# Patient Record
Sex: MALE | Race: WHITE | NOT HISPANIC OR LATINO | Employment: FULL TIME | URBAN - METROPOLITAN AREA
[De-identification: names, ages, dates, MRNs, and addresses within clinical notes are randomized per-mention and may not be internally consistent; named-entity substitution may affect disease eponyms.]

---

## 2022-08-14 ENCOUNTER — HOSPITAL ENCOUNTER (EMERGENCY)
Facility: HOSPITAL | Age: 37
Discharge: HOME | End: 2022-08-14
Attending: EMERGENCY MEDICINE
Payer: COMMERCIAL

## 2022-08-14 VITALS
TEMPERATURE: 99 F | BODY MASS INDEX: 28.72 KG/M2 | HEIGHT: 67 IN | HEART RATE: 69 BPM | OXYGEN SATURATION: 99 % | RESPIRATION RATE: 16 BRPM | SYSTOLIC BLOOD PRESSURE: 139 MMHG | WEIGHT: 183 LBS | DIASTOLIC BLOOD PRESSURE: 93 MMHG

## 2022-08-14 DIAGNOSIS — R51.9 FRONTAL HEADACHE: Primary | ICD-10-CM

## 2022-08-14 LAB
CTP QC/QA: YES
SARS-COV-2 RDRP RESP QL NAA+PROBE: NEGATIVE

## 2022-08-14 PROCEDURE — 96372 THER/PROPH/DIAG INJ SC/IM: CPT | Performed by: PHYSICIAN ASSISTANT

## 2022-08-14 PROCEDURE — 63600175 PHARM REV CODE 636 W HCPCS: Performed by: PHYSICIAN ASSISTANT

## 2022-08-14 PROCEDURE — 99284 EMERGENCY DEPT VISIT MOD MDM: CPT | Mod: 25

## 2022-08-14 PROCEDURE — U0002 COVID-19 LAB TEST NON-CDC: HCPCS | Performed by: STUDENT IN AN ORGANIZED HEALTH CARE EDUCATION/TRAINING PROGRAM

## 2022-08-14 PROCEDURE — 25000003 PHARM REV CODE 250: Performed by: PHYSICIAN ASSISTANT

## 2022-08-14 PROCEDURE — 96372 THER/PROPH/DIAG INJ SC/IM: CPT | Mod: 59

## 2022-08-14 RX ORDER — BUTALBITAL, ACETAMINOPHEN AND CAFFEINE 50; 325; 40 MG/1; MG/1; MG/1
1 TABLET ORAL
Status: COMPLETED | OUTPATIENT
Start: 2022-08-14 | End: 2022-08-14

## 2022-08-14 RX ORDER — BUTALBITAL, ACETAMINOPHEN AND CAFFEINE 50; 325; 40 MG/1; MG/1; MG/1
1 TABLET ORAL EVERY 4 HOURS PRN
Qty: 20 TABLET | Refills: 0 | Status: SHIPPED | OUTPATIENT
Start: 2022-08-14 | End: 2022-09-13

## 2022-08-14 RX ORDER — DEXAMETHASONE SODIUM PHOSPHATE 4 MG/ML
12 INJECTION, SOLUTION INTRA-ARTICULAR; INTRALESIONAL; INTRAMUSCULAR; INTRAVENOUS; SOFT TISSUE
Status: COMPLETED | OUTPATIENT
Start: 2022-08-14 | End: 2022-08-14

## 2022-08-14 RX ORDER — PROCHLORPERAZINE MALEATE 10 MG
10 TABLET ORAL 3 TIMES DAILY PRN
Qty: 12 TABLET | Refills: 0 | Status: SHIPPED | OUTPATIENT
Start: 2022-08-14

## 2022-08-14 RX ADMIN — DEXAMETHASONE SODIUM PHOSPHATE 12 MG: 4 INJECTION INTRA-ARTICULAR; INTRALESIONAL; INTRAMUSCULAR; INTRAVENOUS; SOFT TISSUE at 10:08

## 2022-08-14 RX ADMIN — BUTALBITAL, ACETAMINOPHEN AND CAFFEINE 1 TABLET: 50; 325; 40 TABLET ORAL at 11:08

## 2022-08-15 NOTE — ED PROVIDER NOTES
Encounter Date: 8/14/2022       History     Chief Complaint   Patient presents with    Headache     Pt comes to the er via pov with c/o headache that started x1 week ago. Pt stated that the headache started small above his left eye and has gotten bigger and more intense. Pt states that he has taken medication to relieve the headache with no relief.      37yo M smoker with chief complaint L frontal HA x 6-7d, now to entire forehead. Pain mostly just above OS. No visual disturbance. No hx glaucoma. No trauma. No fever. No sinus issues. No URI. No lightheadedness, dizziness, syncope or near syncope, n/v, arm or leg weakness, slurred speech, facial droop, aphasia. Initially relief with Ibuprofen, no relief over the past few days. HA constant, moderate, pressure-type pain.     PMH:  Anxiety  Thyroid CA s/p R hemithyroidectomy  Post operative hypothyroidism        Review of patient's allergies indicates:  No Known Allergies  Past Medical History:   Diagnosis Date    Thyroid cancer      Past Surgical History:   Procedure Laterality Date    THYROIDECTOMY       History reviewed. No pertinent family history.  Social History     Tobacco Use    Smoking status: Current Every Day Smoker     Types: Vaping with nicotine    Smokeless tobacco: Never Used   Substance Use Topics    Alcohol use: Yes     Comment: social    Drug use: Never     Review of Systems   Constitutional: Negative for appetite change and fever.   HENT: Negative for congestion and ear pain.    Eyes: Negative for photophobia and visual disturbance.   Gastrointestinal: Negative for nausea and vomiting.   Musculoskeletal: Negative for neck pain and neck stiffness.   Neurological: Positive for headaches. Negative for dizziness, syncope, facial asymmetry, speech difficulty, weakness and light-headedness.       Physical Exam     Initial Vitals [08/14/22 2131]   BP Pulse Resp Temp SpO2   (!) 142/97 78 16 98.4 °F (36.9 °C) 98 %      MAP       --         Physical  Exam    Nursing note and vitals reviewed.  Constitutional: He appears well-developed and well-nourished. He is not diaphoretic. No distress.   HENT:   Head: Normocephalic and atraumatic.   Eyes: EOM are normal. Pupils are equal, round, and reactive to light.   No nystagmus   Neck: Neck supple.   Normal range of motion.  Pulmonary/Chest: No respiratory distress.   Musculoskeletal:         General: No tenderness. Normal range of motion.      Cervical back: Normal range of motion and neck supple.     Neurological: He is alert and oriented to person, place, and time. GCS score is 15. GCS eye subscore is 4. GCS verbal subscore is 5. GCS motor subscore is 6.   Grossly symmetric upper and lower extremity strength.  Normal, steady gait.   Skin: Skin is warm. Capillary refill takes less than 2 seconds.   Psychiatric: He has a normal mood and affect. Thought content normal.         ED Course   Procedures  Labs Reviewed   SARS-COV-2 RDRP GENE          Imaging Results          CT Head Without Contrast (Final result)  Result time 08/14/22 23:07:55    Final result by Candida Burch MD (08/14/22 23:07:55)                 Impression:      No acute intracranial abnormalities identified.      Electronically signed by: Candida Burch MD  Date:    08/14/2022  Time:    23:07             Narrative:    EXAMINATION:  CT HEAD WITHOUT CONTRAST    CLINICAL HISTORY:  Headache, chronic, new features or increased frequency;L frontal HA; sinus complaints; hx thyroid CA--r/o obvious mets;    TECHNIQUE:  Low dose axial images were obtained through the head.  Coronal and sagittal reformations were also performed. Contrast was not administered.    COMPARISON:  None.    FINDINGS:  The brain is normally formed and exhibits normal density throughout with no indication of acute/recent major vascular distribution cerebral infarction, intraparenchymal hemorrhage, or intra-axial space occupying lesion. The ventricular system is normal in size and  configuration with no evidence of hydrocephalus. No effacement of the skull-base cisterns. No abnormal extra-axial fluid collections or blood products. Visualized paranasal sinuses and mastoid air cells are essentially clear.  The calvarium shows no significant abnormality.                                 Medications   butalbital-acetaminophen-caffeine -40 mg per tablet 1 tablet (has no administration in time range)   dexamethasone injection 12 mg (12 mg Intramuscular Given 8/14/22 2246)     Medical Decision Making:   Differential Diagnosis:   Sinusitis, migraine, headache disorder, metastatic disease  Clinical Tests:   Lab Tests: Ordered and Reviewed  Radiological Study: Ordered and Reviewed  ED Management:  CT head unremarkable.  No evidence of any significant sinus disease.  May represent migraine or headache disorder.  No worrisome neurologic complaints.  No focal deficits on exam.  No history of CVA.  Will treat with Compazine and Benadryl.  Decadron to prevent rebound headaches.  Takes Benadryl nightly to help with sleep.  Advised to try Flonase see if there is any relief as well.  Referral placed to neurology given persistent headache.  Return precautions discussed.  He is comfortable with this plan, comfortable with outpatient follow-up.                      Clinical Impression:   Final diagnoses:  [R51.9] Frontal headache (Primary)          ED Disposition Condition    Discharge Stable        ED Prescriptions     Medication Sig Dispense Start Date End Date Auth. Provider    prochlorperazine (COMPAZINE) 10 MG tablet Take 1 tablet (10 mg total) by mouth 3 (three) times daily as needed (headache). 12 tablet 8/14/2022  Saeed Sharif PA-C    butalbital-acetaminophen-caffeine -40 mg (FIORICET, ESGIC) -40 mg per tablet Take 1 tablet by mouth every 4 (four) hours as needed for Headaches. 20 tablet 8/14/2022 9/13/2022 Saeed Sharif PA-C        Follow-up Information     Follow up With  Specialties Details Why Contact Info    [facility] NEUROLOGY Neurology Schedule an appointment as soon as possible for a visit  For reevaluation, If symptoms persist despite treatment [id number] [facility] [address]  [phone]           Saeed Sharif PA-C  08/14/22 9930

## 2022-08-15 NOTE — DISCHARGE INSTRUCTIONS
Take the compazine with 25mg of Benadryl, especially at night. Be aware, this medication is sedating.  Do not mix with alcohol or any other sedating medications.  Do not drive or operate machinery when taking this medication.     You can try Fioricet throughout the day.    Follow-up with Neurology should you experience persistent symptoms.    Return to this ED if headache worsens despite treatment, if you begin with neck pain or stiffness, fever, arm or leg weakness, unsteady gait, lightheadedness, if any other problems occur.

## 2022-08-15 NOTE — ED TRIAGE NOTES
Taqueria Bruce is a 36 y.o male to ED c/o HA x1 wk.  States that pain was initially above left eye but pain intensified over the past few days.  Worse pain 8/10 & 8/11/22.  Denies n/v, visual changes, or other complaints.  Hx of thyroid cancer.  Surg hx thyroidectomy

## 2022-12-28 ENCOUNTER — HOSPITAL ENCOUNTER (EMERGENCY)
Facility: HOSPITAL | Age: 37
Discharge: HOME | End: 2022-12-28
Attending: EMERGENCY MEDICINE
Payer: COMMERCIAL

## 2022-12-28 VITALS
OXYGEN SATURATION: 97 % | SYSTOLIC BLOOD PRESSURE: 118 MMHG | WEIGHT: 180 LBS | DIASTOLIC BLOOD PRESSURE: 72 MMHG | HEIGHT: 67 IN | BODY MASS INDEX: 28.25 KG/M2 | RESPIRATION RATE: 15 BRPM | HEART RATE: 63 BPM | TEMPERATURE: 98 F

## 2022-12-28 DIAGNOSIS — R07.9 CHEST PAIN: ICD-10-CM

## 2022-12-28 DIAGNOSIS — R07.89 CHEST WALL PAIN: Primary | ICD-10-CM

## 2022-12-28 LAB
ALBUMIN SERPL BCP-MCNC: 4.4 G/DL (ref 3.5–5.2)
ALP SERPL-CCNC: 62 U/L (ref 55–135)
ALT SERPL W/O P-5'-P-CCNC: 24 U/L (ref 10–44)
ANION GAP SERPL CALC-SCNC: 9 MMOL/L (ref 8–16)
AST SERPL-CCNC: 19 U/L (ref 10–40)
BASOPHILS # BLD AUTO: 0.02 K/UL (ref 0–0.2)
BASOPHILS NFR BLD: 0.4 % (ref 0–1.9)
BILIRUB SERPL-MCNC: 0.6 MG/DL (ref 0.1–1)
BNP SERPL-MCNC: <10 PG/ML (ref 0–99)
BUN SERPL-MCNC: 15 MG/DL (ref 6–20)
CALCIUM SERPL-MCNC: 9.1 MG/DL (ref 8.7–10.5)
CHLORIDE SERPL-SCNC: 102 MMOL/L (ref 95–110)
CO2 SERPL-SCNC: 28 MMOL/L (ref 23–29)
CREAT SERPL-MCNC: 1.2 MG/DL (ref 0.5–1.4)
CTP QC/QA: YES
D DIMER PPP IA.FEU-MCNC: <0.19 MG/L FEU
DIFFERENTIAL METHOD: ABNORMAL
EOSINOPHIL # BLD AUTO: 0.1 K/UL (ref 0–0.5)
EOSINOPHIL NFR BLD: 1.8 % (ref 0–8)
ERYTHROCYTE [DISTWIDTH] IN BLOOD BY AUTOMATED COUNT: 11 % (ref 11.5–14.5)
EST. GFR  (NO RACE VARIABLE): >60 ML/MIN/1.73 M^2
GLUCOSE SERPL-MCNC: 111 MG/DL (ref 70–110)
HCT VFR BLD AUTO: 42.5 % (ref 40–54)
HGB BLD-MCNC: 14.9 G/DL (ref 14–18)
IMM GRANULOCYTES # BLD AUTO: 0.01 K/UL (ref 0–0.04)
IMM GRANULOCYTES NFR BLD AUTO: 0.2 % (ref 0–0.5)
LYMPHOCYTES # BLD AUTO: 1.7 K/UL (ref 1–4.8)
LYMPHOCYTES NFR BLD: 37.7 % (ref 18–48)
MCH RBC QN AUTO: 31.1 PG (ref 27–31)
MCHC RBC AUTO-ENTMCNC: 35.1 G/DL (ref 32–36)
MCV RBC AUTO: 89 FL (ref 82–98)
MONOCYTES # BLD AUTO: 0.5 K/UL (ref 0.3–1)
MONOCYTES NFR BLD: 10.3 % (ref 4–15)
NEUTROPHILS # BLD AUTO: 2.2 K/UL (ref 1.8–7.7)
NEUTROPHILS NFR BLD: 49.6 % (ref 38–73)
NRBC BLD-RTO: 0 /100 WBC
PLATELET # BLD AUTO: 192 K/UL (ref 150–450)
PMV BLD AUTO: 10.7 FL (ref 9.2–12.9)
POTASSIUM SERPL-SCNC: 4.1 MMOL/L (ref 3.5–5.1)
PROT SERPL-MCNC: 7.8 G/DL (ref 6–8.4)
RBC # BLD AUTO: 4.79 M/UL (ref 4.6–6.2)
SARS-COV-2 RDRP RESP QL NAA+PROBE: NEGATIVE
SODIUM SERPL-SCNC: 139 MMOL/L (ref 136–145)
TROPONIN I SERPL DL<=0.01 NG/ML-MCNC: <0.006 NG/ML (ref 0–0.03)
WBC # BLD AUTO: 4.48 K/UL (ref 3.9–12.7)

## 2022-12-28 PROCEDURE — 99285 EMERGENCY DEPT VISIT HI MDM: CPT | Mod: 25

## 2022-12-28 PROCEDURE — 93010 EKG 12-LEAD: ICD-10-PCS | Mod: ,,, | Performed by: INTERNAL MEDICINE

## 2022-12-28 PROCEDURE — 83880 ASSAY OF NATRIURETIC PEPTIDE: CPT | Performed by: EMERGENCY MEDICINE

## 2022-12-28 PROCEDURE — 93010 ELECTROCARDIOGRAM REPORT: CPT | Mod: ,,, | Performed by: INTERNAL MEDICINE

## 2022-12-28 PROCEDURE — 85379 FIBRIN DEGRADATION QUANT: CPT | Performed by: EMERGENCY MEDICINE

## 2022-12-28 PROCEDURE — 80053 COMPREHEN METABOLIC PANEL: CPT | Performed by: EMERGENCY MEDICINE

## 2022-12-28 PROCEDURE — 63600175 PHARM REV CODE 636 W HCPCS: Performed by: EMERGENCY MEDICINE

## 2022-12-28 PROCEDURE — 25000242 PHARM REV CODE 250 ALT 637 W/ HCPCS: Performed by: EMERGENCY MEDICINE

## 2022-12-28 PROCEDURE — 94640 AIRWAY INHALATION TREATMENT: CPT

## 2022-12-28 PROCEDURE — 84484 ASSAY OF TROPONIN QUANT: CPT | Performed by: EMERGENCY MEDICINE

## 2022-12-28 PROCEDURE — 85025 COMPLETE CBC W/AUTO DIFF WBC: CPT | Performed by: EMERGENCY MEDICINE

## 2022-12-28 PROCEDURE — 96374 THER/PROPH/DIAG INJ IV PUSH: CPT

## 2022-12-28 PROCEDURE — 87635 SARS-COV-2 COVID-19 AMP PRB: CPT | Performed by: EMERGENCY MEDICINE

## 2022-12-28 PROCEDURE — 93005 ELECTROCARDIOGRAM TRACING: CPT

## 2022-12-28 RX ORDER — IBUPROFEN 600 MG/1
600 TABLET ORAL EVERY 6 HOURS PRN
Qty: 20 TABLET | Refills: 0 | Status: SHIPPED | OUTPATIENT
Start: 2022-12-28

## 2022-12-28 RX ORDER — IPRATROPIUM BROMIDE AND ALBUTEROL SULFATE 2.5; .5 MG/3ML; MG/3ML
3 SOLUTION RESPIRATORY (INHALATION)
Status: COMPLETED | OUTPATIENT
Start: 2022-12-28 | End: 2022-12-28

## 2022-12-28 RX ORDER — KETOROLAC TROMETHAMINE 30 MG/ML
15 INJECTION, SOLUTION INTRAMUSCULAR; INTRAVENOUS
Status: COMPLETED | OUTPATIENT
Start: 2022-12-28 | End: 2022-12-28

## 2022-12-28 RX ORDER — ALBUTEROL SULFATE 90 UG/1
2 AEROSOL, METERED RESPIRATORY (INHALATION) EVERY 6 HOURS PRN
Qty: 18 G | Refills: 0 | Status: SHIPPED | OUTPATIENT
Start: 2022-12-28 | End: 2023-12-28

## 2022-12-28 RX ORDER — LEVOTHYROXINE SODIUM 175 UG/1
175 TABLET ORAL
COMMUNITY
Start: 2022-11-02

## 2022-12-28 RX ADMIN — KETOROLAC TROMETHAMINE 15 MG: 30 INJECTION, SOLUTION INTRAMUSCULAR; INTRAVENOUS at 11:12

## 2022-12-28 RX ADMIN — IPRATROPIUM BROMIDE AND ALBUTEROL SULFATE 3 ML: 2.5; .5 SOLUTION RESPIRATORY (INHALATION) at 10:12

## 2022-12-28 NOTE — ED TRIAGE NOTES
Pt to ED with complaints of chest pain/pressure accompanied by SOB X3 days with night sweats last night. States has a pulse oximeter at home that has been showing 88%. 97% RA in triage.   Denies congestion, cough, dizziness, bladder bowel issues.   Pt AAOX4

## 2022-12-28 NOTE — ED PROVIDER NOTES
Encounter Date: 12/28/2022    SCRIBE #1 NOTE: I, Kacie Fregoso, am scribing for, and in the presence of,  Gabriel Renee MD. I have scribed the following portions of the note - Other sections scribed: HPI, ROS.     History     Chief Complaint   Patient presents with    Chest Pain     Pt to ED with complaints of chest pain/pressure accompanied by SOB X3 days. States pain is constant, feels like elephant on chest. States has a pulse oximeter at home that has been showing 88%. 97% RA in triage. Denies congestion, cough.      Taqueria Bruce is a 37 y.o. male, with a PMHx of Thyroid Cancer, who presents to the ED with intermittent chest pain accompanied with sob for the past 3 days. Patient notes he had mild/sharp waxing and waning mid-sternal chest pain 2 days ago at 3 am. Patient states his chest pain is very mild during his assessment. Patient notes he bought a pulse oximeter and it reported his O2 was 88% to 83%, which prompted his visit today. No other exacerbating or alleviating factors. Patient reports he has been coughing a little, but denies congestion or other associated symptoms.     The history is provided by the patient. No  was used.   Review of patient's allergies indicates:  No Known Allergies  Past Medical History:   Diagnosis Date    Thyroid cancer      Past Surgical History:   Procedure Laterality Date    THYROIDECTOMY       History reviewed. No pertinent family history.  Social History     Tobacco Use    Smoking status: Every Day     Types: Vaping with nicotine    Smokeless tobacco: Never   Substance Use Topics    Alcohol use: Yes     Comment: social    Drug use: Never     Review of Systems   Constitutional: Negative.    HENT: Negative.  Negative for congestion.    Eyes: Negative.    Respiratory:  Positive for cough and shortness of breath.    Cardiovascular:  Positive for chest pain.   Gastrointestinal: Negative.    Genitourinary: Negative.    Musculoskeletal: Negative.     Skin: Negative.    Neurological: Negative.      Physical Exam     Initial Vitals [12/28/22 0858]   BP Pulse Resp Temp SpO2   (!) 145/99 85 16 98.2 °F (36.8 °C) 98 %      MAP       --         Physical Exam    Nursing note and vitals reviewed.  Constitutional: He appears well-developed and well-nourished. He is not diaphoretic. No distress.   HENT:   Head: Normocephalic and atraumatic.   Nose: Nose normal.   Mouth/Throat: No oropharyngeal exudate.   Eyes: EOM are normal. Pupils are equal, round, and reactive to light.   Neck: Neck supple. No tracheal deviation present. No JVD present.   Normal range of motion.  Cardiovascular:  Normal rate, regular rhythm, normal heart sounds and intact distal pulses.           Pulmonary/Chest: Breath sounds normal. No respiratory distress. He has no wheezes. He has no rhonchi. He has no rales.   Abdominal: Abdomen is soft. Bowel sounds are normal. He exhibits no distension. There is no abdominal tenderness. There is no rebound and no guarding.   Musculoskeletal:         General: No tenderness or edema. Normal range of motion.      Cervical back: Normal range of motion and neck supple.     Neurological: He is alert and oriented to person, place, and time. He has normal strength.   Skin: Skin is warm and dry. Capillary refill takes less than 2 seconds. No rash noted. No erythema.       ED Course   Procedures  Labs Reviewed   CBC W/ AUTO DIFFERENTIAL - Abnormal; Notable for the following components:       Result Value    MCH 31.1 (*)     RDW 11.0 (*)     All other components within normal limits   COMPREHENSIVE METABOLIC PANEL - Abnormal; Notable for the following components:    Glucose 111 (*)     All other components within normal limits   TROPONIN I   B-TYPE NATRIURETIC PEPTIDE   D DIMER, QUANTITATIVE   SARS-COV-2 RDRP GENE        ECG Results              EKG 12-lead (Final result)  Result time 12/28/22 16:50:44      Final result by Interface, Lab In [facility] (12/28/22 16:50:44)                    Narrative:    Test Reason : R07.9,    Vent. Rate : 080 BPM     Atrial Rate : 080 BPM     P-R Int : 132 ms          QRS Dur : 084 ms      QT Int : 360 ms       P-R-T Axes : 037 064 029 degrees     QTc Int : 415 ms    Normal sinus rhythm  Normal ECG  No previous ECGs available  Confirmed by Kye Sorenson MD ([id number]) on 12/28/2022 4:50:36 PM    Referred By:             Confirmed By:Kye Sorenson MD                                  Imaging Results              X-Ray Chest AP Portable (Final result)  Result time 12/28/22 10:17:48      Final result by Cheikh Cordoba MD (12/28/22 10:17:48)                   Impression:      See above      Electronically signed by: Cheikh Cordoba MD  Date:    12/28/2022  Time:    10:17               Narrative:    EXAMINATION:  XR CHEST AP PORTABLE    CLINICAL HISTORY:  Chest Pain;    TECHNIQUE:  Single frontal view of the chest was performed.    COMPARISON:  None    FINDINGS:  Heart size normal.  The lungs are clear.  No pleural effusion                                       Medications   albuterol-ipratropium 2.5 mg-0.5 mg/3 mL nebulizer solution 3 mL (3 mLs Nebulization Given 12/28/22 1004)   ketorolac injection 15 mg (15 mg Intravenous Given 12/28/22 1155)     Medical Decision Making:   History:   Old Medical Records: I decided to obtain old medical records.  Clinical Tests:   Lab Tests: Ordered and Reviewed       MDM:    37 y.o.male with PMHx as noted above, presents with chest pain. Physical exam as noted above.  ED workup remarkable for EKG - nsr, rate 80 bpm, no STEMI, trop - neg, BNP - neg, ddimer neg, CBC/CMP - unremarkable, CXR - unremarkable. Pt presentation consistent with chest pain, completely negative workup today, unremarkable EKG, negative cardiac markers.  Suspect this may be musculoskeletal etiology will give short course of anti-inflammatories, additionally patient noting some slight improvement in his shortness of breath with albuterol inhaler, will continue  treatment with albuterol inhaler outpatient for possible reactive airway disease.  At this time given patient's history, physical exam, and ED workup do not suspect arrhythmia, MI/ACS, PE, PTX, aortic dissection, pericarditis, PNA, shingles, or any further malignant cause.  Discussed diagnosis and further treatment with patient including f/u, return precautions given and all questions answered.  Patient in understanding of plan.  Pt discharged to home improved and stable.         Scribe Attestation:   Scribe #1: I performed the above scribed service and the documentation accurately describes the services I performed. I attest to the accuracy of the note.                   Clinical Impression:   Final diagnoses:  [R07.9] Chest pain  [R07.89] Chest wall pain (Primary)        ED Disposition Condition    Discharge Stable        I, Gabriel Renee M.D., personally performed the services described in this documentation. All medical record entries made by the scribe were at my direction and in my presence. I have reviewed the chart and agree that the record reflects my personal performance and is accurate and complete.   ED Prescriptions       Medication Sig Dispense Start Date End Date Auth. Provider    albuterol (PROVENTIL HFA) 90 mcg/actuation inhaler Inhale 2 puffs into the lungs every 6 (six) hours as needed for Wheezing. Rescue 18 g 12/28/2022 12/28/2023 Gabriel Renee MD    ibuprofen (ADVIL,MOTRIN) 600 MG tablet Take 1 tablet (600 mg total) by mouth every 6 (six) hours as needed for Pain. 20 tablet 12/28/2022 -- Gabriel Renee MD          Follow-up Information       Follow up With Specialties Details Why Contact [facility] Emergency Dept Emergency Medicine Go to  If symptoms worsen [id number]  [facility] [address] [phone]             Gabriel Renee MD  12/29/22 0938

## 2022-12-28 NOTE — Clinical Note
"Taqueria Crowe" Teo was seen and treated in our emergency department on 12/28/2022.  He may return to work on 12/29/2022.       If you have any questions or concerns, please don't hesitate to call.      Gabriel Renee MD"

## 2024-01-04 ENCOUNTER — OFFICE VISIT (OUTPATIENT)
Dept: URGENT CARE | Facility: CLINIC | Age: 39
End: 2024-01-04
Payer: OTHER MISCELLANEOUS

## 2024-01-04 VITALS
BODY MASS INDEX: 29.66 KG/M2 | HEART RATE: 76 BPM | OXYGEN SATURATION: 97 % | WEIGHT: 189 LBS | RESPIRATION RATE: 16 BRPM | SYSTOLIC BLOOD PRESSURE: 146 MMHG | TEMPERATURE: 97 F | HEIGHT: 67 IN | DIASTOLIC BLOOD PRESSURE: 92 MMHG

## 2024-01-04 DIAGNOSIS — R20.2 PARESTHESIAS: ICD-10-CM

## 2024-01-04 DIAGNOSIS — S46.911A STRAIN OF RIGHT UPPER ARM, INITIAL ENCOUNTER: Primary | ICD-10-CM

## 2024-01-04 DIAGNOSIS — Z02.6 ENCOUNTER RELATED TO WORKER'S COMPENSATION CLAIM: ICD-10-CM

## 2024-01-04 PROCEDURE — 99204 OFFICE O/P NEW MOD 45 MIN: CPT | Mod: S$GLB,,, | Performed by: EMERGENCY MEDICINE

## 2024-01-04 RX ORDER — ONDANSETRON HYDROCHLORIDE 8 MG/1
8 TABLET, FILM COATED ORAL 3 TIMES DAILY
COMMUNITY
Start: 2023-11-16

## 2024-01-04 RX ORDER — ROSUVASTATIN CALCIUM 20 MG/1
20 TABLET, COATED ORAL NIGHTLY
COMMUNITY
Start: 2023-09-05

## 2024-01-04 NOTE — PROGRESS NOTES
Subjective:      Patient ID: Taqueria Bruce is a 38 y.o. male.    Chief Complaint: Arm Injury (DOI: 01/28/2023 RT arm/LM)    Patient's place of employment - [employer]   Patient's job title - [employer]   Date of injury - 12/28/2023  Body part injured including left or right - RT Arm  Injury Mechanism -   What they were doing when they got hurt - Trying to start in engine when it locked up he felt his arm jerk.   What they did immediately after - reported injury   Pain scale right now - 1  LM    Patient is a 38-year-old male who works for [employer] and states he was trying to pull the cord for an injury to start up in effort several attempts the cord locked up and he felt a jerking shocking pain to his right upper extremity.  He reports occasional pain over the last week to the right shoulder elbow and wrist with some tingling intermittently in scattered areas and nonanatomic distribution of the right arm, lateral elbow, dorsal forearm and 4th and 5th digits.  All symptoms specifically pain has resolved with intermittent tingling scattered throughout.  He states that the initial shock was most felt on the volar forearm aspect.  Physical exam shows normal range of motion of the shoulder, elbow, wrist, fingers.  No sensory deficit and normal motor function.  There is no hematoma and he reports that swelling has resolved.  Will see back in 1 week as he will continue to rest, ice, elevate, take ibuprofen 600 mg every 6-8 hours as needed and will put into place limited use of the right upper extremity specifically no lifting with the right upper extremity over 10 lb.  Return to clinic 1 week for anticipated discharge from occupational health at that time.    Arm Injury   The incident occurred more than 1 week ago. The incident occurred at work. The quality of the pain is described as aching. The pain radiates to the left hand and left arm. The pain is at a severity of 1/10. The pain is mild. The pain has been  Fluctuating since the incident. Pertinent negatives include no chest pain, muscle weakness, numbness or tingling. The symptoms are aggravated by movement. He has tried NSAIDs for the symptoms. The treatment provided mild relief.       ROS    Constitution: Negative for chills and fever.   HENT:  Negative for postnasal drip, sinus pain and sore throat.    Neck: Negative for neck pain and neck stiffness.   Cardiovascular:  Negative for chest pain and palpitations.   Respiratory:  Negative for cough and shortness of breath.    Genitourinary:  Negative for dysuria and hematuria.   Musculoskeletal:  Positive for pain. Negative for joint pain.   Skin:  Negative for wound and laceration.   Neurological:  Negative for altered mental status, numbness and tingling.   Psychiatric/Behavioral:  Negative for altered mental status.      Objective:     Physical Exam  Vitals and nursing note reviewed.   Constitutional:       General: He is not in acute distress.     Appearance: Normal appearance. He is well-developed. He is not ill-appearing, toxic-appearing or diaphoretic.   HENT:      Head: Normocephalic and atraumatic. No abrasion, contusion or laceration.      Jaw: No trismus.      Right Ear: Hearing, tympanic membrane, ear canal and external ear normal. No hemotympanum.      Left Ear: Hearing, tympanic membrane, ear canal and external ear normal. No hemotympanum.      Nose: Nose normal. No nasal deformity, mucosal edema or rhinorrhea.      Right Sinus: No maxillary sinus tenderness or frontal sinus tenderness.      Left Sinus: No maxillary sinus tenderness or frontal sinus tenderness.      Mouth/Throat:      Dentition: Normal dentition.      Pharynx: Uvula midline. No posterior oropharyngeal erythema or uvula swelling.   Eyes:      General: Lids are normal. No scleral icterus.        Right eye: No discharge.         Left eye: No discharge.      Conjunctiva/sclera: Conjunctivae normal.      Pupils: Pupils are equal, round, and  reactive to light.      Comments: Sclera clear bilat   Neck:      Trachea: Trachea and phonation normal. No tracheal deviation.   Cardiovascular:      Rate and Rhythm: Normal rate and regular rhythm.      Pulses: Normal pulses.      Heart sounds: Normal heart sounds.   Pulmonary:      Effort: Pulmonary effort is normal. No respiratory distress.      Breath sounds: Normal breath sounds.   Abdominal:      General: Bowel sounds are normal. There is no distension.      Palpations: Abdomen is soft. There is no mass or pulsatile mass.      Tenderness: There is no abdominal tenderness.   Musculoskeletal:         General: Signs of injury present. No swelling or deformity. Normal range of motion.      Cervical back: Full passive range of motion without pain, normal range of motion and neck supple. No rigidity. No spinous process tenderness or muscular tenderness.      Comments: EXAMINATION OF THE RIGHT UPPER EXTREMITY SHOWS NORMAL RANGE OF MOTION OF THE RIGHT SHOULDER INCLUDING FLEXION EXTENSION INTERNAL AND EXTERNAL ROTATION.  EXAMINATION OF THE RIGHT ELBOW SHOWS NORMAL RANGE OF MOTION WITH NO PAIN ON PRONATION OR SUPINATION FLEXION-EXTENSION.  EXAMINATION OF THE RIGHT WRIST SHOWS NO TENDERNESS TO PALPATION.  HE DOES STATE THAT HE OCCASIONALLY HAS SHORT-LIVED TINGLING SENSATION TO THE LATERAL ELBOW DORSAL WRIST AND 4TH AND 5TH DIGIT WHICH IS RESOLVED AT THIS TIME.  THIS IS SUBJECTIVE AND CURRENTLY NOT PRESENT   Skin:     General: Skin is warm and dry.      Capillary Refill: Capillary refill takes less than 2 seconds.      Coloration: Skin is not pale.      Findings: No abrasion, bruising, burn, ecchymosis or laceration.   Neurological:      Mental Status: He is alert and oriented to person, place, and time.      GCS: GCS eye subscore is 4. GCS verbal subscore is 5. GCS motor subscore is 6.      Cranial Nerves: No cranial nerve deficit.      Sensory: No sensory deficit.      Motor: No abnormal muscle tone or seizure  activity.      Coordination: Coordination normal.   Psychiatric:         Speech: Speech normal.         Behavior: Behavior normal. Behavior is cooperative.         Thought Content: Thought content normal.         Judgment: Judgment normal.         Assessment:      1. Strain of right upper arm, initial encounter    2. Paresthesias    3. Encounter related to worker's compensation claim      Plan:     Patient is a 38-year-old male who works for [employer] and states he was trying to pull the cord for an injury to start up in effort several attempts the cord locked up and he felt a jerking shocking pain to his right upper extremity.  He reports occasional pain over the last week to the right shoulder elbow and wrist with some tingling intermittently in scattered areas and nonanatomic distribution of the right arm, lateral elbow, dorsal forearm and 4th and 5th digits.  All symptoms specifically pain has resolved with intermittent tingling scattered throughout.  He states that the initial shock was most felt on the volar forearm aspect.  Physical exam shows normal range of motion of the shoulder, elbow, wrist, fingers.  No sensory deficit and normal motor function.  There is no hematoma and he reports that swelling has resolved.  Will see back in 1 week as he will continue to rest, ice, elevate, take ibuprofen 600 mg every 6-8 hours as needed and will put into place limited use of the right upper extremity specifically no lifting with the right upper extremity over 10 lb.  Return to clinic 1 week for anticipated discharge from occupational health at that time.     Patient Instructions: Attention not to aggravate affected area, Apply ice 24-48 hours then apply heat/warm soaks, Elevated affected area   Restrictions: No lifting/pushing/pulling more than 10 lbs, Limited use of right hand and arm  Follow up in about 1 week (around 1/11/2024).

## 2024-01-04 NOTE — LETTER
[facility] - Urgent Care  [address] [facility], [address]  [place] [address]  Phone: [phone]  Fax: [phone]  [facility]: [id number]    Pt Name: Taqueria Bruce  Injury Date: 11/28/2023   Employee ID: [id number] Date of First Treatment: 01/04/2024   Company: [employer] to [employer]      Appointment Time: 11:00 AM Arrived: 11:24 am   Provider: Gurvinder Tomlinson MD Time Out:12:45 pm     Office Treatment:   1. Strain of right upper arm, initial encounter    2. Paresthesias    3. Encounter related to worker's compensation claim          Patient Instructions: Attention not to aggravate affected area, Apply ice 24-48 hours then apply heat/warm soaks, Elevated affected area    Restrictions: No lifting/pushing/pulling more than 10 lbs, Limited use of right hand and arm     Return Appointment: 1/11/2024 at 9:30 am  SW

## 2024-01-11 ENCOUNTER — OFFICE VISIT (OUTPATIENT)
Dept: URGENT CARE | Facility: CLINIC | Age: 39
End: 2024-01-11
Payer: OTHER MISCELLANEOUS

## 2024-01-11 DIAGNOSIS — Z02.6 ENCOUNTER RELATED TO WORKER'S COMPENSATION CLAIM: ICD-10-CM

## 2024-01-11 DIAGNOSIS — R20.2 PARESTHESIAS: ICD-10-CM

## 2024-01-11 DIAGNOSIS — S46.911D STRAIN OF RIGHT UPPER ARM, SUBSEQUENT ENCOUNTER: Primary | ICD-10-CM

## 2024-01-11 PROCEDURE — 99214 OFFICE O/P EST MOD 30 MIN: CPT | Mod: S$GLB,,, | Performed by: EMERGENCY MEDICINE

## 2024-01-11 NOTE — LETTER
[facility] - Urgent Care  [address] [facility], [address]  [place] [address]  Phone: [phone]  Fax: [phone]  [facility]: [id number]    Pt Name: Taqueria Bruce  Injury Date: 11/28/2023   Employee ID: [id number] Date of  Treatment: 01/11/2024   Company: [email or web address]      Appointment Time: 09:30 AM Arrived: 09:20 AM   Provider: Gurvinder Tomlinson MD Time Out:09:48 AM     Office Treatment:   1. Strain of right upper arm, subsequent encounter    2. Paresthesias    3. Encounter related to worker's compensation claim          Patient Instructions: Attention not to aggravate affected area    Restrictions: Limited use of right hand and arm, No lifting/pushing/pulling more than 10 lbs     Return Appointment: 1/18/2024 at 09:00 AM  SB

## 2024-01-18 ENCOUNTER — OFFICE VISIT (OUTPATIENT)
Dept: URGENT CARE | Facility: CLINIC | Age: 39
End: 2024-01-18
Payer: OTHER MISCELLANEOUS

## 2024-01-18 DIAGNOSIS — R20.2 PARESTHESIAS: ICD-10-CM

## 2024-01-18 DIAGNOSIS — S46.911D STRAIN OF RIGHT UPPER ARM, SUBSEQUENT ENCOUNTER: Primary | ICD-10-CM

## 2024-01-18 PROCEDURE — 99214 OFFICE O/P EST MOD 30 MIN: CPT | Mod: S$GLB,,, | Performed by: EMERGENCY MEDICINE

## 2024-01-18 NOTE — PROGRESS NOTES
Subjective:      Patient ID: Taqueria Bruce is a 38 y.o. male.    Chief Complaint: Arm Injury (DOI:12/28/2023--Rt arm/SW)     Patient's place of employment - [employer]  Patient's job title -   Date of Injury - 12/28/2023  Body part injured - Rt shoulder and hand   Current work status per last visit - Light duty  Improved, same, or worse - Same  Pain Scale right now (1-10) -  1  Sw    PATIENT REPORTS SIGNIFICANT IMPROVEMENT AND RESOLUTION OF PAIN.  EXAMINATION SHOWS NORMAL RANGE OF MOTION WITHOUT PAIN OR PARESTHESIAS.  WILL BE DISCHARGED TO WORK REGULAR DUTY TAKING CARE NOT TO AGGRAVATE THE AFFECTED AREA.  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    Arm Injury   Pertinent negatives include no chest pain or numbness.     ROS    Constitution: Negative for chills and fever.   HENT:  Negative for postnasal drip, sinus pain and sore throat.    Neck: Negative for neck pain and neck stiffness.   Cardiovascular:  Negative for chest pain and palpitations.   Respiratory:  Negative for cough and shortness of breath.    Genitourinary:  Negative for dysuria and hematuria.   Musculoskeletal:  Positive for muscle ache. Negative for joint pain.   Skin:  Negative for wound and laceration.   Neurological:  Negative for altered mental status, numbness and tingling.   Psychiatric/Behavioral:  Negative for altered mental status.      Objective:     Physical Exam  Vitals and nursing note reviewed.   Constitutional:       General: He is not in acute distress.     Appearance: Normal appearance. He is well-developed. He is not ill-appearing, toxic-appearing or diaphoretic.   HENT:      Head: Normocephalic and atraumatic. No abrasion, contusion or laceration.      Jaw: No trismus.      Right Ear: Hearing, tympanic membrane, ear canal and external ear normal. No hemotympanum.      Left Ear: Hearing, tympanic membrane, ear canal and external ear  normal. No hemotympanum.      Nose: Nose normal. No nasal deformity, mucosal edema or rhinorrhea.      Right Sinus: No maxillary sinus tenderness or frontal sinus tenderness.      Left Sinus: No maxillary sinus tenderness or frontal sinus tenderness.      Mouth/Throat:      Dentition: Normal dentition.      Pharynx: Uvula midline. No posterior oropharyngeal erythema or uvula swelling.   Eyes:      General: Lids are normal. No scleral icterus.        Right eye: No discharge.         Left eye: No discharge.      Conjunctiva/sclera: Conjunctivae normal.      Pupils: Pupils are equal, round, and reactive to light.      Comments: Sclera clear bilat   Neck:      Trachea: Trachea and phonation normal. No tracheal deviation.   Cardiovascular:      Rate and Rhythm: Normal rate and regular rhythm.      Pulses: Normal pulses.      Heart sounds: Normal heart sounds.   Pulmonary:      Effort: Pulmonary effort is normal. No respiratory distress.      Breath sounds: Normal breath sounds.   Abdominal:      General: Bowel sounds are normal. There is no distension.      Palpations: Abdomen is soft. There is no mass or pulsatile mass.      Tenderness: There is no abdominal tenderness.   Musculoskeletal:         General: Signs of injury present. No swelling or deformity. Normal range of motion.      Cervical back: Full passive range of motion without pain, normal range of motion and neck supple. No rigidity. No spinous process tenderness or muscular tenderness.      Comments: EXAMINATION OF THE RIGHT UPPER EXTREMITY SHOWS NORMAL RANGE OF MOTION OF THE RIGHT SHOULDER INCLUDING FLEXION EXTENSION INTERNAL AND EXTERNAL ROTATION.  EXAMINATION OF THE RIGHT ELBOW SHOWS NORMAL RANGE OF MOTION WITH NO PAIN ON PRONATION OR SUPINATION FLEXION-EXTENSION.  EXAMINATION OF THE RIGHT WRIST SHOWS NO TENDERNESS TO PALPATION.  PARESTHESIAS HAVE RESOLVED.  HE HAS MINIMAL PAIN WITH THUMB OPPOSITION IN WRIST FLEXION HOWEVER SIGNIFICANTLY IMPROVED FROM PRIOR  EXAMINATION.   Skin:     General: Skin is warm and dry.      Capillary Refill: Capillary refill takes less than 2 seconds.      Coloration: Skin is not pale.      Findings: No abrasion, bruising, burn, ecchymosis or laceration.   Neurological:      Mental Status: He is alert and oriented to person, place, and time.      GCS: GCS eye subscore is 4. GCS verbal subscore is 5. GCS motor subscore is 6.      Cranial Nerves: No cranial nerve deficit.      Sensory: No sensory deficit.      Motor: No abnormal muscle tone or seizure activity.      Coordination: Coordination normal.   Psychiatric:         Speech: Speech normal.         Behavior: Behavior normal. Behavior is cooperative.         Thought Content: Thought content normal.         Judgment: Judgment normal.        Assessment:      1. Strain of right upper arm, subsequent encounter    2. Paresthesias      Plan:     PATIENT REPORTS SIGNIFICANT IMPROVEMENT AND RESOLUTION OF PAIN.  EXAMINATION SHOWS NORMAL RANGE OF MOTION WITHOUT PAIN OR PARESTHESIAS.  WILL BE DISCHARGED TO WORK REGULAR DUTY TAKING CARE NOT TO AGGRAVATE THE AFFECTED AREA.  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

## 2024-01-18 NOTE — LETTER
[facility] - Urgent Care  [address] [facility], [address]  [place] [address]  Phone: [phone]  Fax: [phone]  [facility]: [id number]    Pt Name: Taqueria Bruce  Injury Date: 11/28/2023   Employee ID: [id number] Date of Treatment: 01/18/2024   Company: [employer]      Appointment Time: 08:45 AM Arrived: 8:53am   Provider: Gurvinder Tomlinson MD Time Out:9:40am     Office Treatment:   1. Strain of right upper arm, subsequent encounter    2. Paresthesias          Patient Instructions: Attention not to aggravate affected area    Restrictions: Regular Duty, Discharged from Occupational Health     Return Appointment: If symptoms worsen or fail to improve   SW

## 2025-07-18 ENCOUNTER — HOSPITAL ENCOUNTER (EMERGENCY)
Facility: HOSPITAL | Age: 40
Discharge: HOME | End: 2025-07-18
Attending: EMERGENCY MEDICINE
Payer: COMMERCIAL

## 2025-07-18 VITALS
OXYGEN SATURATION: 97 % | HEIGHT: 67 IN | TEMPERATURE: 99 F | RESPIRATION RATE: 20 BRPM | SYSTOLIC BLOOD PRESSURE: 156 MMHG | DIASTOLIC BLOOD PRESSURE: 93 MMHG | HEART RATE: 76 BPM | BODY MASS INDEX: 29.82 KG/M2 | WEIGHT: 190 LBS

## 2025-07-18 DIAGNOSIS — J01.10 ACUTE NON-RECURRENT FRONTAL SINUSITIS: Primary | ICD-10-CM

## 2025-07-18 LAB
CTP QC/QA: YES
CTP QC/QA: YES
POC MOLECULAR INFLUENZA A AGN: NEGATIVE
POC MOLECULAR INFLUENZA B AGN: NEGATIVE
SARS-COV-2 RDRP RESP QL NAA+PROBE: NEGATIVE

## 2025-07-18 PROCEDURE — 63600175 PHARM REV CODE 636 W HCPCS

## 2025-07-18 PROCEDURE — 87502 INFLUENZA DNA AMP PROBE: CPT

## 2025-07-18 PROCEDURE — 96372 THER/PROPH/DIAG INJ SC/IM: CPT

## 2025-07-18 PROCEDURE — 87635 SARS-COV-2 COVID-19 AMP PRB: CPT

## 2025-07-18 PROCEDURE — 99284 EMERGENCY DEPT VISIT MOD MDM: CPT | Mod: 25

## 2025-07-18 RX ORDER — DEXAMETHASONE SODIUM PHOSPHATE 4 MG/ML
8 INJECTION, SOLUTION INTRA-ARTICULAR; INTRALESIONAL; INTRAMUSCULAR; INTRAVENOUS; SOFT TISSUE
Status: COMPLETED | OUTPATIENT
Start: 2025-07-18 | End: 2025-07-18

## 2025-07-18 RX ORDER — FLUTICASONE PROPIONATE 50 MCG
1 SPRAY, SUSPENSION (ML) NASAL 2 TIMES DAILY PRN
Qty: 15 G | Refills: 0 | Status: SHIPPED | OUTPATIENT
Start: 2025-07-18

## 2025-07-18 RX ORDER — CETIRIZINE HYDROCHLORIDE 10 MG/1
10 TABLET ORAL DAILY
Qty: 30 TABLET | Refills: 0 | Status: SHIPPED | OUTPATIENT
Start: 2025-07-18 | End: 2025-08-17

## 2025-07-18 RX ADMIN — DEXAMETHASONE SODIUM PHOSPHATE 8 MG: 4 INJECTION INTRA-ARTICULAR; INTRALESIONAL; INTRAMUSCULAR; INTRAVENOUS; SOFT TISSUE at 01:07

## 2025-07-18 NOTE — ED PROVIDER NOTES
Encounter Date: 7/18/2025    SCRIBE #1 NOTE: I, Sara Lima, am scribing for, and in the presence of,  Farhan Knox PA-C. I have scribed the following portions of the note - Other sections scribed: HPI,ROS,PE.       History     Chief Complaint   Patient presents with    Headache     Intermittent headache, runny nose x18 days. Denies sob     Patient is a 39 y.o. male with a past medical history of thyroid cancer status post thyroidectomy who presents to the Emergency Department for evaluation of URI symptoms x 2.5 weeks.  Symptoms include intermittent headache, sinus pressure, rhinorrhea, post nasal drip.  He has taken aleve and ibuprofen for the symptoms with some relief. Further reports treatment with sudafed and Mucinex without relief. Denies ever seeing ENT.  Denies known sick contacts. Denies history of diabetes mellitus. He denies chest pain shortness of breath, abdominal pain. Denies fever, chills. Denies nausea, vomiting, or diarrhea. Denies cough, chest pain, sneezing.       The history is provided by the patient. No  was used.     Review of patient's allergies indicates:  No Known Allergies  Past Medical History:   Diagnosis Date    Thyroid cancer      Past Surgical History:   Procedure Laterality Date    THYROIDECTOMY       No family history on file.  Social History[1]  Review of Systems   Constitutional:  Negative for chills and fever.   HENT:  Positive for postnasal drip and rhinorrhea. Negative for congestion and sore throat.    Eyes:  Negative for visual disturbance.   Respiratory:  Negative for cough and shortness of breath.    Cardiovascular:  Negative for chest pain.   Gastrointestinal:  Negative for abdominal pain, diarrhea, nausea and vomiting.   Genitourinary:  Negative for difficulty urinating, dysuria and frequency.   Musculoskeletal:  Negative for myalgias.   Skin:  Negative for rash.   Neurological:  Positive for headaches. Negative for dizziness and  light-headedness.       Physical Exam     Initial Vitals [07/18/25 1250]   BP Pulse Resp Temp SpO2   (!) 156/93 76 20 98.6 °F (37 °C) 97 %      MAP       --         Physical Exam    Nursing note and vitals reviewed.  Constitutional: He appears well-developed and well-nourished.   HENT:   Head: Normocephalic and atraumatic.   Right Ear: External ear normal.   Left Ear: External ear normal.   There is no posterior oropharyngeal erythema however there is a cobblestone appearing oropharynx with a postnasal drip, no tonsillar swelling, no oropharyngeal exudates, uvula is midline. No tenderness over sinuses. Normal dentition. No trismus.  No muffled voice. No submandibular swelling. Patient is tolerating secretions without difficulty.  Patient is speaking in full sentences on exam without difficulty.  Bilateral tympanic membranes are pearly gray without erythema, bulging, perforation.  There is no postauricular swelling, or overlying erythema or tenderness to palpation over mastoids bilaterally.    Neck: Carotid bruit is not present.   Normal range of motion.  Cardiovascular:  Normal rate, regular rhythm, normal heart sounds and intact distal pulses.     Exam reveals no gallop and no friction rub.       No murmur heard.  Pulmonary/Chest: Breath sounds normal. No respiratory distress. He has no wheezes. He has no rhonchi. He has no rales.   Musculoskeletal:         General: Normal range of motion.      Cervical back: Normal range of motion.     Neurological: He is alert and oriented to person, place, and time. GCS score is 15. GCS eye subscore is 4. GCS verbal subscore is 5. GCS motor subscore is 6.   Psychiatric: He has a normal mood and affect.         ED Course   Procedures  Labs Reviewed   POCT INFLUENZA A/B MOLECULAR       Result Value    POC Molecular Influenza A Ag Negative      POC Molecular Influenza B Ag Negative       Acceptable Yes     SARS-COV-2 RDRP GENE    POC Rapid COVID Negative      Quality  Control Acceptable Yes            Imaging Results    None          Medications   dexAMETHasone injection 8 mg (8 mg Intramuscular Given 7/18/25 1304)     Medical Decision Making  This is an emergent evaluation of a 39 y.o. male with a past medical history of thyroid cancer status post thyroidectomy who presents to the Emergency Department for evaluation of URI symptoms x 2.5 weeks.  Symptoms include intermittent headache, sinus pressure, rhinorrhea, post nasal drip.    Physical exam as above.    Differential diagnosis includes but is not limited to allergic rhinitis with postnasal drip, seasonal allergies, sinusitis, COVID, flu, other viral syndrome.    Workup initiated with viral swabs in triage.  Ordered Decadron.  Vital signs, chart, labs, and/or imaging were all reviewed.  See ED course below and interpretations above. My overall impression is sinusitis. Will discharge home with Zyrtec, Flonase with primary care follow-up. Vital signs are reassuring. Patient/Caregiver is stable for discharge at this time.  Patient/Caregiver was informed of results, plan of care, and are comfortable with this.  All questions and concerns were addressed. Discussed strict return precautions with the patient/caregiver. Instructed follow up with primary care provider within 1 week.      Farhan Knox PA-C    DISCLAIMER: This note was prepared with [employer] voice recognition transcription software. Garbled syntax, mangled pronouns, and other bizarre constructions may be attributed to that software system.       Risk  OTC drugs.  Prescription drug management.            Scribe Attestation:   Scribe #1: I performed the above scribed service and the documentation accurately describes the services I performed. I attest to the accuracy of the note.        ED Course as of 07/18/25 1334   Fri Jul 18, 2025   1259 BP(!): 156/93 [TM]   1259 MAP (mmHg): 119 [TM]   1259 Temp: 98.6 °F (37 °C) [TM]   1259 Pulse: 76 [TM]   1259 Resp: 20 [TM]   1259  SpO2: 97 % [TM]      ED Course User Index  [TM] Farhan Knox PA-C                               Clinical Impression:  Final diagnoses:  [J01.10] Acute non-recurrent frontal sinusitis (Primary)          ED Disposition Condition    Discharge Stable          ED Prescriptions       Medication Sig Dispense Start Date End Date Auth. Provider    cetirizine (ZYRTEC) 10 MG tablet Take 1 tablet (10 mg total) by mouth once daily. 30 tablet 7/18/2025 8/17/2025 Farhan Knox PA-C    fluticasone propionate (FLONASE) 50 mcg/actuation nasal spray 1 spray (50 mcg total) by Each Nostril route 2 (two) times daily as needed for Rhinitis. 15 g 7/18/2025 -- Farhan Knox PA-C          Follow-up Information       Follow up With Specialties Details Why Contact Info    [facility] Emergency Medicine Go to  As needed, If symptoms worsen, or new symptoms develop [address] [facility] [place] [address] [phone]    Primary care doctor  Schedule an appointment as soon as possible for a visit in 3 days              IFarhan PA-C, personally performed the services described in this documentation. All medical record entries made by the scribe were at my direction and in my presence. I have reviewed the chart and agree that the record reflects my personal performance and is accurate and complete.      DISCLAIMER: This note was prepared with [employer] voice recognition transcription software. Garbled syntax, mangled pronouns, and other bizarre constructions may be attributed to that software system.         [1]   Social History  Tobacco Use    Smoking status: Every Day     Types: Vaping with nicotine    Smokeless tobacco: Never   Substance Use Topics    Alcohol use: Yes     Comment: social    Drug use: Never        Farhan Knox PA-C  07/18/25 7789

## 2025-07-18 NOTE — Clinical Note
"Taqueria Crowe" Teo was seen and treated in our emergency department on 7/18/2025.  He may return to work on 07/22/2025.       If you have any questions or concerns, please don't hesitate to call.      Farhan Knox PA-C"

## 2025-07-30 ENCOUNTER — HOSPITAL ENCOUNTER (EMERGENCY)
Facility: HOSPITAL | Age: 40
Discharge: HOME | End: 2025-07-30
Attending: STUDENT IN AN ORGANIZED HEALTH CARE EDUCATION/TRAINING PROGRAM
Payer: COMMERCIAL

## 2025-07-30 VITALS
OXYGEN SATURATION: 95 % | WEIGHT: 185 LBS | TEMPERATURE: 98 F | SYSTOLIC BLOOD PRESSURE: 133 MMHG | RESPIRATION RATE: 18 BRPM | HEART RATE: 72 BPM | DIASTOLIC BLOOD PRESSURE: 84 MMHG | HEIGHT: 67 IN | BODY MASS INDEX: 29.03 KG/M2

## 2025-07-30 DIAGNOSIS — R51.9 NONINTRACTABLE HEADACHE, UNSPECIFIED CHRONICITY PATTERN, UNSPECIFIED HEADACHE TYPE: Primary | ICD-10-CM

## 2025-07-30 LAB
ABSOLUTE EOSINOPHIL (OHS): 0.17 K/UL
ABSOLUTE MONOCYTE (OHS): 0.52 K/UL (ref 0.3–1)
ABSOLUTE NEUTROPHIL COUNT (OHS): 3.08 K/UL (ref 1.8–7.7)
ALBUMIN SERPL BCP-MCNC: 5.1 G/DL (ref 3.5–5.2)
ALP SERPL-CCNC: 66 UNIT/L (ref 40–150)
ALT SERPL W/O P-5'-P-CCNC: 24 UNIT/L (ref 10–44)
AMORPH CRY UR QL COMP ASSIST: ABNORMAL
ANION GAP (OHS): 9 MMOL/L (ref 8–16)
AST SERPL-CCNC: 32 UNIT/L (ref 11–45)
BACTERIA #/AREA URNS AUTO: ABNORMAL /HPF
BASOPHILS # BLD AUTO: 0.03 K/UL
BASOPHILS NFR BLD AUTO: 0.5 %
BILIRUB SERPL-MCNC: 0.7 MG/DL (ref 0.1–1)
BILIRUB UR QL STRIP.AUTO: NEGATIVE
BUN SERPL-MCNC: 24 MG/DL (ref 6–20)
CALCIUM SERPL-MCNC: 9.5 MG/DL (ref 8.7–10.5)
CHLORIDE SERPL-SCNC: 100 MMOL/L (ref 95–110)
CLARITY UR: ABNORMAL
CO2 SERPL-SCNC: 27 MMOL/L (ref 23–29)
COLOR UR AUTO: YELLOW
CREAT SERPL-MCNC: 1.1 MG/DL (ref 0.5–1.4)
ERYTHROCYTE [DISTWIDTH] IN BLOOD BY AUTOMATED COUNT: 11.9 % (ref 11.5–14.5)
GFR SERPLBLD CREATININE-BSD FMLA CKD-EPI: >60 ML/MIN/1.73/M2
GLUCOSE SERPL-MCNC: 120 MG/DL (ref 70–110)
GLUCOSE UR QL STRIP: NEGATIVE
HCT VFR BLD AUTO: 44.4 % (ref 40–54)
HGB BLD-MCNC: 15.3 GM/DL (ref 14–18)
HGB UR QL STRIP: ABNORMAL
HYALINE CASTS UR QL AUTO: 0 /LPF (ref 0–1)
IMM GRANULOCYTES # BLD AUTO: 0.01 K/UL (ref 0–0.04)
IMM GRANULOCYTES NFR BLD AUTO: 0.2 % (ref 0–0.5)
KETONES UR QL STRIP: NEGATIVE
LEUKOCYTE ESTERASE UR QL STRIP: NEGATIVE
LYMPHOCYTES # BLD AUTO: 2.01 K/UL (ref 1–4.8)
MCH RBC QN AUTO: 31.1 PG (ref 27–31)
MCHC RBC AUTO-ENTMCNC: 34.5 G/DL (ref 32–36)
MCV RBC AUTO: 90 FL (ref 82–98)
MICROSCOPIC COMMENT: ABNORMAL
NITRITE UR QL STRIP: NEGATIVE
NUCLEATED RBC (/100WBC) (OHS): 0 /100 WBC
PH UR STRIP: 6 [PH]
PLATELET # BLD AUTO: 208 K/UL (ref 150–450)
PMV BLD AUTO: 10.5 FL (ref 9.2–12.9)
POTASSIUM SERPL-SCNC: 3.8 MMOL/L (ref 3.5–5.1)
PROT SERPL-MCNC: 8.6 GM/DL (ref 6–8.4)
PROT UR QL STRIP: ABNORMAL
RBC # BLD AUTO: 4.92 M/UL (ref 4.6–6.2)
RBC #/AREA URNS AUTO: 1 /HPF (ref 0–4)
RELATIVE EOSINOPHIL (OHS): 2.9 %
RELATIVE LYMPHOCYTE (OHS): 34.5 % (ref 18–48)
RELATIVE MONOCYTE (OHS): 8.9 % (ref 4–15)
RELATIVE NEUTROPHIL (OHS): 53 % (ref 38–73)
SODIUM SERPL-SCNC: 136 MMOL/L (ref 136–145)
SP GR UR STRIP: >=1.03
UROBILINOGEN UR STRIP-ACNC: NEGATIVE EU/DL
WBC # BLD AUTO: 5.82 K/UL (ref 3.9–12.7)
WBC #/AREA URNS AUTO: 1 /HPF (ref 0–5)

## 2025-07-30 PROCEDURE — 85025 COMPLETE CBC W/AUTO DIFF WBC: CPT

## 2025-07-30 PROCEDURE — 99285 EMERGENCY DEPT VISIT HI MDM: CPT | Mod: 25

## 2025-07-30 PROCEDURE — 96374 THER/PROPH/DIAG INJ IV PUSH: CPT

## 2025-07-30 PROCEDURE — 80053 COMPREHEN METABOLIC PANEL: CPT

## 2025-07-30 PROCEDURE — 63600175 PHARM REV CODE 636 W HCPCS: Mod: JZ,TB

## 2025-07-30 PROCEDURE — 81003 URINALYSIS AUTO W/O SCOPE: CPT

## 2025-07-30 RX ORDER — KETOROLAC TROMETHAMINE 30 MG/ML
15 INJECTION, SOLUTION INTRAMUSCULAR; INTRAVENOUS
Status: COMPLETED | OUTPATIENT
Start: 2025-07-30 | End: 2025-07-30

## 2025-07-30 RX ORDER — BUTALBITAL, ACETAMINOPHEN AND CAFFEINE 50; 325; 40 MG/1; MG/1; MG/1
1 TABLET ORAL EVERY 8 HOURS PRN
Qty: 15 TABLET | Refills: 0 | Status: SHIPPED | OUTPATIENT
Start: 2025-07-30

## 2025-07-30 RX ADMIN — KETOROLAC TROMETHAMINE 15 MG: 30 INJECTION, SOLUTION INTRAMUSCULAR; INTRAVENOUS at 08:07

## 2025-07-30 NOTE — ED PROVIDER NOTES
Encounter Date: 7/30/2025    SCRIBE #1 NOTE: I, Marialuisa Shaw, am scribing for, and in the presence of,  Allan Kolb PA-C. I have scribed the following portions of the note - Other sections scribed: HPI/ROS.       History     Chief Complaint   Patient presents with    Headache     Patient c/o constant headache x 5 weeks. Reports left side headache that radiates to frontal lobe. Reports being seen and treated for symptoms with no relief. Reports left eye vision changes x 10 days.      Patient is a 39 y.o. male, with a PMHx of HCL, Thyroid cancer s/p thyroidectomy, who presents to the ED with constant dull headache onset 5 weeks. Patient reports left sided headache with intermittent frontal pain. Pt reports intermittent left eye blurry vision.  Denies blurry vision at present.  Per chart review pt was in ED on 7/18/25 with URI symptoms and intermittent headache, prescribed zyrtec and flonase to no avail. Pt also attempted treatment with Ibuprofen and Tylenol to no avail. Denies smoking cigarettes or drug use.  Denies fever, neck pain, neck stiffness.  No other exacerbating or alleviating factors. Denies numbness, paresthesia, ataxia, dysarthria, tinnitus, nausea, vomiting, photosensitivity, eye discharge, hyperacusis, or other associated symptoms.       The history is provided by the patient. No  was used.     Review of patient's allergies indicates:  No Known Allergies  Past Medical History:   Diagnosis Date    Thyroid cancer      Past Surgical History:   Procedure Laterality Date    THYROIDECTOMY       No family history on file.  Social History[1]  Review of Systems   Constitutional:  Negative for chills and fever.   HENT:  Negative for congestion, rhinorrhea, sore throat and tinnitus.         Negative for hyperacusis.   Eyes:  Positive for visual disturbance (intermittent left eye blurry vision). Negative for photophobia and discharge.   Respiratory:  Negative for cough and shortness of  breath.    Cardiovascular:  Negative for chest pain.   Gastrointestinal:  Negative for abdominal pain, diarrhea, nausea and vomiting.   Genitourinary:  Negative for dysuria, frequency and hematuria.   Musculoskeletal:  Negative for back pain.   Skin:  Negative for rash.   Neurological:  Positive for headaches. Negative for dizziness, speech difficulty, weakness and numbness.        Negative for paresthesia and ataxia.       Physical Exam     Initial Vitals [07/30/25 0741]   BP Pulse Resp Temp SpO2   (!) 148/101 84 18 98.7 °F (37.1 °C) 97 %      MAP       --         Physical Exam    Nursing note and vitals reviewed.  Constitutional: Vital signs are normal. He appears well-developed and well-nourished. He is cooperative. He does not appear ill. No distress.   Well-appearing.  No acute distress.   HENT:   Head: Normocephalic and atraumatic.       Right Ear: External ear normal.   Left Ear: External ear normal.   Nose: Nose normal.   Eyes: Conjunctivae and EOM are normal.   Pupils equal round and reactive to light.  Extraocular motions intact.   Neck: Phonation normal.   Normal range of motion.  Cardiovascular:  Normal rate and regular rhythm.           No murmur heard.  Regular rate and rhythm.   Pulmonary/Chest: Effort normal. No respiratory distress.   Respirations even and unlabored.   Abdominal: Abdomen is flat. He exhibits no distension. There is no abdominal tenderness.   Musculoskeletal:      Cervical back: Normal range of motion.     Neurological: He is alert and oriented to person, place, and time. GCS eye subscore is 4. GCS verbal subscore is 5. GCS motor subscore is 6.   Normal neurological exam with no focal neurological deficits.  Cranial nerves 2-12 grossly normal.  Ambulatory with steady gait.  Pupils equal round and reactive to light.  Motor function and sensation are present and symmetrical in bilateral upper and lower extremities.  Finger-to-nose intact bilaterally.   Skin: Skin is warm and dry.  Capillary refill takes less than 2 seconds. No rash noted.         ED Course   Procedures  Labs Reviewed   COMPREHENSIVE METABOLIC PANEL - Abnormal       Result Value    Sodium 136      Potassium 3.8      Chloride 100      CO2 27      Glucose 120 (*)     BUN 24 (*)     Creatinine 1.1      Calcium 9.5      Protein Total 8.6 (*)     Albumin 5.1      Bilirubin Total 0.7      ALP 66      AST 32      ALT 24      Anion Gap 9      eGFR >60     URINALYSIS, REFLEX TO URINE CULTURE - Abnormal    Color, UA Yellow      Appearance, UA Cloudy (*)     pH, UA 6.0      Spec Grav UA >=1.030 (*)     Protein, UA 1+ (*)     Glucose, UA Negative      Ketones, UA Negative      Bilirubin, UA Negative      Blood, UA Trace (*)     Nitrites, UA Negative      Urobilinogen, UA Negative      Leukocyte Esterase, UA Negative     CBC WITH DIFFERENTIAL - Abnormal    WBC 5.82      RBC 4.92      HGB 15.3      HCT 44.4      MCV 90      MCH 31.1 (*)     MCHC 34.5      RDW 11.9      Platelet Count 208      MPV 10.5      Nucleated RBC 0      Neut % 53.0      Lymph % 34.5      Mono % 8.9      Eos % 2.9      Basophil % 0.5      Imm Grans % 0.2      Neut # 3.08      Lymph # 2.01      Mono # 0.52      Eos # 0.17      Baso # 0.03      Imm Grans # 0.01     URINALYSIS MICROSCOPIC - Abnormal    RBC, UA 1      WBC, UA 1      Bacteria, UA Rare      Hyaline Casts, UA 0      Amorphous, UA Many (*)     Microscopic Comment       CBC W/ AUTO DIFFERENTIAL    Narrative:     The following orders were created for panel order CBC auto differential.  Procedure                               Abnormality         Status                     ---------                               -----------         ------                     CBC with Differential[[id number]]       Abnormal            Final result                 Please view results for these tests on the individual orders.   [facility] URINE HOLD          Imaging Results              CT Head Without Contrast (Final result)  Result  time 07/30/25 08:55:51      Final result by Home Sanchez MD (07/30/25 08:55:51)                   Impression:      No CT evidence of acute intracranial abnormality.      Electronically signed by: Home Sanchez MD  Date:    07/30/2025  Time:    08:55               Narrative:    EXAMINATION:  CT HEAD WITHOUT CONTRAST    CLINICAL HISTORY:  Headache, chronic, new features or increased frequency;    TECHNIQUE:  Low dose axial images were obtained through the head.  Coronal and sagittal reformations were also performed. Contrast was not administered.    COMPARISON:  08/14/2022.    FINDINGS:  No evidence of acute territorial infarct, hemorrhage, mass effect, or midline shift.    Ventricles are normal in size and configuration.    No displaced calvarial fracture.    Minimal mucosal thickening in the paranasal sinuses.  No air-fluid levels.  Mastoid air cells are essentially clear.                                       Medications   ketorolac injection 15 mg (15 mg Intravenous Given 7/30/25 0829)     Medical Decision Making  39-year-old male presenting to the emergency department for evaluation of left-sided upper temporal headache that has been present for the last 5 weeks.  Described as dull.  Intermittent left eye blurry vision that is not present at this time.  Otherwise denies neurological symptoms.  No fever or neck stiffness.  Reports no period of time over the last 5 weeks in which his headache was completely resolved.  On exam, he was well-appearing and in no acute distress.  Vitals within normal limits.  Normal neurological exam with no focal deficits.  Cardiac and lung exams within normal limits.    Differential diagnosis includes but is not limited to migraine, cluster, or tension headache, posttraumatic headache, dehydration, electrolyte abnormality, poor sleep, poor p.o. intake, COVID, flu, other upper respiratory infection, subarachnoid hemorrhage, subdural hematoma.     Labs reassuring.  No  leukocytosis or anemia.  CMP with mildly elevated protein and BUN.  Urinalysis with cloudy appearance, high specific gravity, 1+ protein, and trace blood.  Microscopic UA with many amorphous cells.  With no improvement in headache over the last 5 weeks, a CT of the head was ordered however returned negative for any acute intracranial abnormality.  Unclear etiology of patient's headache today.  Toradol with moderate relief of symptoms.  I will have him keep track of his headaches and follow up with Neurology as an outpatient.  May benefit from MRI.  Fioricet for pain control in the meantime.  No convincing evidence of acute neurological process today.  Stable for discharge home to outpatient follow up.    Return precautions were discussed, all patient questions were answered, and the patient was agreeable to the plan of care.  He was discharged home in stable condition and will follow up with his primary care provider or return to the emergency department if his symptoms worsen or do not improve.     Amount and/or Complexity of Data Reviewed  External Data Reviewed: notes.     Details: See HPI  Labs: ordered. Decision-making details documented in ED Course.  Radiology: ordered. Decision-making details documented in ED Course.    Risk  Prescription drug management.            Scribe Attestation:   Scribe #1: I performed the above scribed service and the documentation accurately describes the services I performed. I attest to the accuracy of the note.                          I, Allan Kolb PA-C, personally performed the services described in this documentation. All medical record entries made by the scribe were at my direction and in my presence. I have reviewed the chart and agree that the record reflects my personal performance and is accurate and complete.           Clinical Impression:  Final diagnoses:  [R51.9] Nonintractable headache, unspecified chronicity pattern, unspecified headache type (Primary)           ED Disposition Condition    Discharge Stable          ED Prescriptions       Medication Sig Dispense Start Date End Date Auth. Provider    butalbital-acetaminophen-caffeine -40 mg (FIORICET, ESGIC) -40 mg per tablet Take 1 tablet by mouth every 8 (eight) hours as needed. 15 tablet 7/30/2025 -- Allan Kolb PA-C          Follow-up Information       Follow up With Specialties Details Why Contact Info    [facility] -  Schedule an appointment as soon as possible for a visit  As needed, If symptoms worsen [address] [place] [address]  [phone]      [facility] Emergency Medicine Go to  If symptoms worsen [address] [facility] [place] [id number] [phone]                   [1]   Social History  Tobacco Use    Smoking status: Every Day     Types: Vaping with nicotine    Smokeless tobacco: Never   Substance Use Topics    Alcohol use: Yes     Comment: social    Drug use: Never        Allan Kolb PA-C  07/30/25 1047

## 2025-07-30 NOTE — DISCHARGE INSTRUCTIONS

## 2025-07-30 NOTE — ED NOTES
"40 y/o male report to the ED for a left sided headache w/ vision changes X 5 weeks. Pt describes the headache as "dull". Pt has been taking Ibuprofen and Tylenol with no changes to the sx. Pt denies vomiting, nausea, CP and SOB.  "

## 2025-07-31 LAB — HOLD SPECIMEN: NORMAL
